# Patient Record
Sex: MALE | Race: OTHER | HISPANIC OR LATINO | URBAN - METROPOLITAN AREA
[De-identification: names, ages, dates, MRNs, and addresses within clinical notes are randomized per-mention and may not be internally consistent; named-entity substitution may affect disease eponyms.]

---

## 2023-01-19 NOTE — ASU PATIENT PROFILE, ADULT - FALL HARM RISK - UNIVERSAL INTERVENTIONS
Bed in lowest position, wheels locked, appropriate side rails in place/Call bell, personal items and telephone in reach/Instruct patient to call for assistance before getting out of bed or chair/Non-slip footwear when patient is out of bed/[place] to call system/Physically safe environment - no spills, clutter or unnecessary equipment/Purposeful Proactive Rounding/Room/bathroom lighting operational, light cord in reach

## 2023-01-19 NOTE — ASU PATIENT PROFILE, ADULT - NS PREOP UNDERSTANDS INFO
No solid food after midnight Sunday 1/22/23/yes No solid food after midnight Sunday 1/22/23, water before 12:00pm Monday./yes

## 2023-01-19 NOTE — ASU PATIENT PROFILE, ADULT - DOES PATIENT HAVE ADVANCE DIRECTIVE
wants to fill out form for health care proxy oon DOS/No wants to fill out form for health care proxy oon DOS/Yes

## 2023-01-23 ENCOUNTER — INPATIENT (INPATIENT)
Facility: HOSPITAL | Age: 68
LOS: 0 days | Discharge: HOME | DRG: 419 | End: 2023-01-24
Attending: STUDENT IN AN ORGANIZED HEALTH CARE EDUCATION/TRAINING PROGRAM | Admitting: STUDENT IN AN ORGANIZED HEALTH CARE EDUCATION/TRAINING PROGRAM
Payer: MEDICARE

## 2023-01-23 ENCOUNTER — OUTPATIENT (OUTPATIENT)
Dept: OUTPATIENT SERVICES | Facility: HOSPITAL | Age: 68
LOS: 1 days | Discharge: HOME | End: 2023-01-23
Payer: MEDICARE

## 2023-01-23 VITALS
OXYGEN SATURATION: 97 % | DIASTOLIC BLOOD PRESSURE: 92 MMHG | HEART RATE: 96 BPM | RESPIRATION RATE: 15 BRPM | SYSTOLIC BLOOD PRESSURE: 151 MMHG

## 2023-01-23 VITALS
RESPIRATION RATE: 16 BRPM | OXYGEN SATURATION: 97 % | HEIGHT: 66 IN | HEART RATE: 53 BPM | SYSTOLIC BLOOD PRESSURE: 125 MMHG | WEIGHT: 180.56 LBS | DIASTOLIC BLOOD PRESSURE: 68 MMHG | TEMPERATURE: 97 F

## 2023-01-23 DIAGNOSIS — K81.2 ACUTE CHOLECYSTITIS WITH CHRONIC CHOLECYSTITIS: ICD-10-CM

## 2023-01-23 DIAGNOSIS — E78.5 HYPERLIPIDEMIA, UNSPECIFIED: ICD-10-CM

## 2023-01-23 DIAGNOSIS — Z98.890 OTHER SPECIFIED POSTPROCEDURAL STATES: Chronic | ICD-10-CM

## 2023-01-23 DIAGNOSIS — K81.0 ACUTE CHOLECYSTITIS: ICD-10-CM

## 2023-01-23 DIAGNOSIS — I10 ESSENTIAL (PRIMARY) HYPERTENSION: ICD-10-CM

## 2023-01-23 DIAGNOSIS — K66.0 PERITONEAL ADHESIONS (POSTPROCEDURAL) (POSTINFECTION): ICD-10-CM

## 2023-01-23 PROCEDURE — 88304 TISSUE EXAM BY PATHOLOGIST: CPT | Mod: 26

## 2023-01-23 DEVICE — SURGIFLO HEMOSTATIC MATRIX KIT: Type: IMPLANTABLE DEVICE | Status: FUNCTIONAL

## 2023-01-23 RX ORDER — HYDRALAZINE HCL 50 MG
10 TABLET ORAL
Refills: 0 | Status: COMPLETED | OUTPATIENT
Start: 2023-01-23 | End: 2023-01-23

## 2023-01-23 RX ORDER — CHLORHEXIDINE GLUCONATE 213 G/1000ML
1 SOLUTION TOPICAL ONCE
Refills: 0 | Status: DISCONTINUED | OUTPATIENT
Start: 2023-01-23 | End: 2023-01-23

## 2023-01-23 RX ORDER — ACETAMINOPHEN 500 MG
650 TABLET ORAL EVERY 6 HOURS
Refills: 0 | Status: DISCONTINUED | OUTPATIENT
Start: 2023-01-23 | End: 2023-01-24

## 2023-01-23 RX ORDER — APREPITANT 80 MG/1
40 CAPSULE ORAL ONCE
Refills: 0 | Status: DISCONTINUED | OUTPATIENT
Start: 2023-01-23 | End: 2023-01-23

## 2023-01-23 RX ORDER — ONDANSETRON 8 MG/1
4 TABLET, FILM COATED ORAL ONCE
Refills: 0 | Status: DISCONTINUED | OUTPATIENT
Start: 2023-01-23 | End: 2023-01-23

## 2023-01-23 RX ORDER — OXYCODONE HYDROCHLORIDE 5 MG/1
5 TABLET ORAL EVERY 6 HOURS
Refills: 0 | Status: DISCONTINUED | OUTPATIENT
Start: 2023-01-23 | End: 2023-01-24

## 2023-01-23 RX ORDER — METOPROLOL TARTRATE 50 MG
5 TABLET ORAL ONCE
Refills: 0 | Status: COMPLETED | OUTPATIENT
Start: 2023-01-23 | End: 2023-01-23

## 2023-01-23 RX ORDER — FENTANYL CITRATE 50 UG/ML
25 INJECTION INTRAVENOUS
Refills: 0 | Status: DISCONTINUED | OUTPATIENT
Start: 2023-01-23 | End: 2023-01-23

## 2023-01-23 RX ORDER — ACETAMINOPHEN 500 MG
1000 TABLET ORAL ONCE
Refills: 0 | Status: DISCONTINUED | OUTPATIENT
Start: 2023-01-23 | End: 2023-01-23

## 2023-01-23 RX ORDER — ROSUVASTATIN CALCIUM 5 MG/1
1 TABLET ORAL
Qty: 0 | Refills: 0 | DISCHARGE

## 2023-01-23 RX ORDER — SODIUM CHLORIDE 9 MG/ML
1000 INJECTION, SOLUTION INTRAVENOUS
Refills: 0 | Status: DISCONTINUED | OUTPATIENT
Start: 2023-01-23 | End: 2023-01-23

## 2023-01-23 RX ADMIN — Medication 5 MILLIGRAM(S): at 21:15

## 2023-01-23 RX ADMIN — Medication 650 MILLIGRAM(S): at 23:56

## 2023-01-23 RX ADMIN — SODIUM CHLORIDE 100 MILLILITER(S): 9 INJECTION, SOLUTION INTRAVENOUS at 20:20

## 2023-01-23 RX ADMIN — Medication 10 MILLIGRAM(S): at 20:45

## 2023-01-23 NOTE — BRIEF OPERATIVE NOTE - OPERATION/FINDINGS
Cristian cutdown. Rest of ports placed under direct visualization. Chronically inflamed, fibrotic gallbladder with a large stone in the infundibulum and significant omental adhesions to the gallbladder. Gallbladder dissected free of attachments with combination of sharp, blunt and electrocautery-assisted dissection. Critical view of safety achieved. Short cystic duct noted, and CBD identified. Cystic duct and artery clipped and amputated. Gallbladder dissected free of fossa and removed in endocatch bag. RUQ irrigated. Hemostatic at end of case. Fascia closed with 0 Vicryl figure of 8. Skin closed with 4-0 Monocryl interrupted.

## 2023-01-23 NOTE — PATIENT PROFILE ADULT - FALL HARM RISK - HARM RISK INTERVENTIONS

## 2023-01-23 NOTE — BRIEF OPERATIVE NOTE - COMMENTS
Given difficulty of dissection and late hour of the case, decision was made to transfer the patient to [facility] postoperatively for overnight monitoring and morning labs.

## 2023-01-23 NOTE — CHART NOTE - NSCHARTNOTEFT_GEN_A_CORE
Pt is a 66yo M pt with pmh of htn, hld, and chronic cholecystitis who presented to [facility] on 1/23/23 for an elective laparoscopic cholecystectomy. Upon entering the abdomen, gallbladder noted to be fibrosed and chronically inflamed which required extensive dissection and a prolonged intraoperative course. The surgery was completed without complication and the patient was transferred to the PACU in stable condition, however the decision was made to transfer the patient to [facility] for overnight observation and hemodynamic monitoring. The risks and benefits of the transferred were discussed with the patient and his wife who were both in agreement with the transfer. Pt will be admitted to regional floor under Dr. Lowe with plan for clear liquid diet, pain/nausea control, labs, and hemodynamic monitoring. Transfer accepted by the [facility] transfer service and paperwork completed and placed in patient's chart.

## 2023-01-24 VITALS
RESPIRATION RATE: 18 BRPM | SYSTOLIC BLOOD PRESSURE: 115 MMHG | DIASTOLIC BLOOD PRESSURE: 64 MMHG | OXYGEN SATURATION: 96 % | TEMPERATURE: 98 F | HEART RATE: 97 BPM

## 2023-01-24 VITALS
SYSTOLIC BLOOD PRESSURE: 149 MMHG | DIASTOLIC BLOOD PRESSURE: 81 MMHG | OXYGEN SATURATION: 96 % | TEMPERATURE: 98 F | HEART RATE: 90 BPM | RESPIRATION RATE: 18 BRPM

## 2023-01-24 LAB
ALBUMIN SERPL ELPH-MCNC: 4 G/DL — SIGNIFICANT CHANGE UP (ref 3.3–5)
ALBUMIN SERPL ELPH-MCNC: 4.1 G/DL — SIGNIFICANT CHANGE UP (ref 3.3–5)
ALP SERPL-CCNC: 44 U/L — SIGNIFICANT CHANGE UP (ref 40–120)
ALP SERPL-CCNC: 46 U/L — SIGNIFICANT CHANGE UP (ref 40–120)
ALT FLD-CCNC: 54 U/L — HIGH (ref 10–45)
ALT FLD-CCNC: 56 U/L — HIGH (ref 10–45)
ANION GAP SERPL CALC-SCNC: 10 MMOL/L — SIGNIFICANT CHANGE UP (ref 5–17)
ANION GAP SERPL CALC-SCNC: 13 MMOL/L — SIGNIFICANT CHANGE UP (ref 5–17)
AST SERPL-CCNC: 49 U/L — HIGH (ref 10–40)
AST SERPL-CCNC: 55 U/L — HIGH (ref 10–40)
BILIRUB SERPL-MCNC: 0.2 MG/DL — SIGNIFICANT CHANGE UP (ref 0.2–1.2)
BILIRUB SERPL-MCNC: 0.3 MG/DL — SIGNIFICANT CHANGE UP (ref 0.2–1.2)
BUN SERPL-MCNC: 16 MG/DL — SIGNIFICANT CHANGE UP (ref 7–23)
BUN SERPL-MCNC: 17 MG/DL — SIGNIFICANT CHANGE UP (ref 7–23)
CALCIUM SERPL-MCNC: 8.9 MG/DL — SIGNIFICANT CHANGE UP (ref 8.4–10.5)
CALCIUM SERPL-MCNC: 9 MG/DL — SIGNIFICANT CHANGE UP (ref 8.4–10.5)
CHLORIDE SERPL-SCNC: 102 MMOL/L — SIGNIFICANT CHANGE UP (ref 96–108)
CHLORIDE SERPL-SCNC: 103 MMOL/L — SIGNIFICANT CHANGE UP (ref 96–108)
CO2 SERPL-SCNC: 21 MMOL/L — LOW (ref 22–31)
CO2 SERPL-SCNC: 23 MMOL/L — SIGNIFICANT CHANGE UP (ref 22–31)
CREAT SERPL-MCNC: 0.92 MG/DL — SIGNIFICANT CHANGE UP (ref 0.5–1.3)
CREAT SERPL-MCNC: 0.93 MG/DL — SIGNIFICANT CHANGE UP (ref 0.5–1.3)
EGFR: 89 ML/MIN/1.73M2 — SIGNIFICANT CHANGE UP
EGFR: 91 ML/MIN/1.73M2 — SIGNIFICANT CHANGE UP
GLUCOSE SERPL-MCNC: 184 MG/DL — HIGH (ref 70–99)
GLUCOSE SERPL-MCNC: 194 MG/DL — HIGH (ref 70–99)
HCT VFR BLD CALC: 38.9 % — LOW (ref 39–50)
HCT VFR BLD CALC: 39.2 % — SIGNIFICANT CHANGE UP (ref 39–50)
HGB BLD-MCNC: 13 G/DL — SIGNIFICANT CHANGE UP (ref 13–17)
HGB BLD-MCNC: 13 G/DL — SIGNIFICANT CHANGE UP (ref 13–17)
MAGNESIUM SERPL-MCNC: 1.7 MG/DL — SIGNIFICANT CHANGE UP (ref 1.6–2.6)
MAGNESIUM SERPL-MCNC: 2.1 MG/DL — SIGNIFICANT CHANGE UP (ref 1.6–2.6)
MCHC RBC-ENTMCNC: 29.6 PG — SIGNIFICANT CHANGE UP (ref 27–34)
MCHC RBC-ENTMCNC: 29.7 PG — SIGNIFICANT CHANGE UP (ref 27–34)
MCHC RBC-ENTMCNC: 33.2 GM/DL — SIGNIFICANT CHANGE UP (ref 32–36)
MCHC RBC-ENTMCNC: 33.4 GM/DL — SIGNIFICANT CHANGE UP (ref 32–36)
MCV RBC AUTO: 89 FL — SIGNIFICANT CHANGE UP (ref 80–100)
MCV RBC AUTO: 89.3 FL — SIGNIFICANT CHANGE UP (ref 80–100)
NRBC # BLD: 0 /100 WBCS — SIGNIFICANT CHANGE UP (ref 0–0)
NRBC # BLD: 0 /100 WBCS — SIGNIFICANT CHANGE UP (ref 0–0)
PHOSPHATE SERPL-MCNC: 2.7 MG/DL — SIGNIFICANT CHANGE UP (ref 2.5–4.5)
PHOSPHATE SERPL-MCNC: 3.3 MG/DL — SIGNIFICANT CHANGE UP (ref 2.5–4.5)
PLATELET # BLD AUTO: 218 K/UL — SIGNIFICANT CHANGE UP (ref 150–400)
PLATELET # BLD AUTO: 239 K/UL — SIGNIFICANT CHANGE UP (ref 150–400)
POTASSIUM SERPL-MCNC: 3.6 MMOL/L — SIGNIFICANT CHANGE UP (ref 3.5–5.3)
POTASSIUM SERPL-MCNC: 4 MMOL/L — SIGNIFICANT CHANGE UP (ref 3.5–5.3)
POTASSIUM SERPL-SCNC: 3.6 MMOL/L — SIGNIFICANT CHANGE UP (ref 3.5–5.3)
POTASSIUM SERPL-SCNC: 4 MMOL/L — SIGNIFICANT CHANGE UP (ref 3.5–5.3)
PROT SERPL-MCNC: 6.8 G/DL — SIGNIFICANT CHANGE UP (ref 6–8.3)
PROT SERPL-MCNC: 6.9 G/DL — SIGNIFICANT CHANGE UP (ref 6–8.3)
RBC # BLD: 4.37 M/UL — SIGNIFICANT CHANGE UP (ref 4.2–5.8)
RBC # BLD: 4.39 M/UL — SIGNIFICANT CHANGE UP (ref 4.2–5.8)
RBC # FLD: 13.6 % — SIGNIFICANT CHANGE UP (ref 10.3–14.5)
RBC # FLD: 13.6 % — SIGNIFICANT CHANGE UP (ref 10.3–14.5)
SODIUM SERPL-SCNC: 136 MMOL/L — SIGNIFICANT CHANGE UP (ref 135–145)
SODIUM SERPL-SCNC: 136 MMOL/L — SIGNIFICANT CHANGE UP (ref 135–145)
WBC # BLD: 8.82 K/UL — SIGNIFICANT CHANGE UP (ref 3.8–10.5)
WBC # BLD: 8.83 K/UL — SIGNIFICANT CHANGE UP (ref 3.8–10.5)
WBC # FLD AUTO: 8.82 K/UL — SIGNIFICANT CHANGE UP (ref 3.8–10.5)
WBC # FLD AUTO: 8.83 K/UL — SIGNIFICANT CHANGE UP (ref 3.8–10.5)

## 2023-01-24 PROCEDURE — 47562 LAPAROSCOPIC CHOLECYSTECTOMY: CPT

## 2023-01-24 PROCEDURE — 85027 COMPLETE CBC AUTOMATED: CPT

## 2023-01-24 PROCEDURE — 80053 COMPREHEN METABOLIC PANEL: CPT

## 2023-01-24 PROCEDURE — 36415 COLL VENOUS BLD VENIPUNCTURE: CPT

## 2023-01-24 PROCEDURE — 84100 ASSAY OF PHOSPHORUS: CPT

## 2023-01-24 PROCEDURE — 49329 UNLSTD LAPS PX ABD PERTM&OMN: CPT

## 2023-01-24 PROCEDURE — 83735 ASSAY OF MAGNESIUM: CPT

## 2023-01-24 RX ORDER — NEBIVOLOL HYDROCHLORIDE 5 MG/1
10 TABLET ORAL DAILY
Refills: 0 | Status: DISCONTINUED | OUTPATIENT
Start: 2023-01-24 | End: 2023-01-24

## 2023-01-24 RX ORDER — FENOFIBRATE,MICRONIZED 130 MG
1 CAPSULE ORAL
Qty: 0 | Refills: 0 | DISCHARGE

## 2023-01-24 RX ORDER — NEBIVOLOL HYDROCHLORIDE 5 MG/1
1 TABLET ORAL
Qty: 0 | Refills: 0 | DISCHARGE

## 2023-01-24 RX ORDER — OXYCODONE HYDROCHLORIDE 5 MG/1
1 TABLET ORAL
Qty: 8 | Refills: 0
Start: 2023-01-24

## 2023-01-24 RX ORDER — MAGNESIUM SULFATE 500 MG/ML
1 VIAL (ML) INJECTION ONCE
Refills: 0 | Status: COMPLETED | OUTPATIENT
Start: 2023-01-24 | End: 2023-01-24

## 2023-01-24 RX ORDER — DOCUSATE SODIUM 100 MG
1 CAPSULE ORAL
Qty: 15 | Refills: 0
Start: 2023-01-24

## 2023-01-24 RX ORDER — ASPIRIN/CALCIUM CARB/MAGNESIUM 324 MG
1 TABLET ORAL
Qty: 0 | Refills: 0 | DISCHARGE

## 2023-01-24 RX ORDER — ONDANSETRON 8 MG/1
4 TABLET, FILM COATED ORAL ONCE
Refills: 0 | Status: DISCONTINUED | OUTPATIENT
Start: 2023-01-24 | End: 2023-01-24

## 2023-01-24 RX ADMIN — Medication 650 MILLIGRAM(S): at 07:16

## 2023-01-24 RX ADMIN — Medication 100 GRAM(S): at 01:18

## 2023-01-24 RX ADMIN — NEBIVOLOL HYDROCHLORIDE 10 MILLIGRAM(S): 5 TABLET ORAL at 07:06

## 2023-01-24 RX ADMIN — Medication 650 MILLIGRAM(S): at 00:56

## 2023-01-24 RX ADMIN — Medication 650 MILLIGRAM(S): at 06:16

## 2023-01-24 NOTE — CHART NOTE - NSCHARTNOTEFT_GEN_A_CORE
66 y/o M pmh HTN, HLD, Chronic Cholecystitis presented on 1/23 for elective cholecystectomy. Challenging dissection w/ chronic inflammation, fibrosis, and prolonged intraoperative course. Decision made to transfer to [facility] for closed hemodynamic monitoring. EBL 50, 1100 Crystalloid. On arrival states pain much improved. Afebrile, HR 90, /81, 96% on RA, Abd soft, appropriate post surgical tenderness, no rebound or guarding. WBC 8.83, Hgb 13.0 (13.7), Cr 0.93 (1.2) AST/ALT 55/56, Alk Phos 46. Plan: HD monitoring, Fluid resuscitation, AINSLEY,

## 2023-01-24 NOTE — ASU DISCHARGE PLAN (ADULT/PEDIATRIC) - CARE PROVIDER_API CALL
Faustino Lowe (DO)  Surgery  [address] [place] [address], [address]  [place] [address]  Phone: [phone]  Fax: [phone]  Follow Up Time: 1 week

## 2023-01-24 NOTE — PROGRESS NOTE ADULT - SUBJECTIVE AND OBJECTIVE BOX
STATUS POST:  laparoscopic cholecystectomy     POST OPERATIVE DAY #: 1    SUBJECTIVE: Pt seen and examined at bedside this am by surgery team. Feels improved. Ambulated last night. Tolerating CLD without nausea or vomiting, passing gas but no having bowel movements. Pain well controlled. Denies f/n/v/cp/sob.    Vital Signs Last 24 Hrs  T(C): 36.4 (24 Jan 2023 05:44), Max: 36.8 (24 Jan 2023 00:16)  T(F): 97.5 (24 Jan 2023 05:44), Max: 98.2 (24 Jan 2023 00:16)  HR: 97 (24 Jan 2023 05:44) (53 - 97)  BP: 115/64 (24 Jan 2023 05:44) (115/64 - 190/95)  BP(mean): --  RR: 18 (24 Jan 2023 05:44) (12 - 18)  SpO2: 96% (24 Jan 2023 05:44) (95% - 100%)    Parameters below as of 24 Jan 2023 05:44  Patient On (Oxygen Delivery Method): room air        PHYSICAL EXAM:   Gen: Awake, alert, NAD, resting comfortably   CV: RRR  Pulm: no respiratory distress on RA  Abd: soft, ND, NTTP, no rebound or guarding, incisions c/d/i   Ext: WWP, SCDs in place     I&O's Detail    23 Jan 2023 07:01  -  24 Jan 2023 07:00  --------------------------------------------------------  IN:    IV PiggyBack: 100 mL    Oral Fluid: 100 mL  Total IN: 200 mL    OUT:    Voided (mL): 300 mL  Total OUT: 300 mL    Total NET: -100 mL          LABS:                        13.0   8.82  )-----------( 239      ( 24 Jan 2023 06:17 )             39.2     01-24    136  |  103  |  16  ----------------------------<  184<H>  4.0   |  23  |  0.92    Ca    9.0      24 Jan 2023 06:17  Phos  3.3     01-24  Mg     2.1     01-24    TPro  6.8  /  Alb  4.0  /  TBili  0.3  /  DBili  x   /  AST  49<H>  /  ALT  54<H>  /  AlkPhos  44  01-24    LIVER FUNCTIONS - ( 24 Jan 2023 06:17 )  Alb: 4.0 g/dL / Pro: 6.8 g/dL / ALK PHOS: 44 U/L / ALT: 54 U/L / AST: 49 U/L / GGT: x             CAPILLARY BLOOD GLUCOSE          RADIOLOGY & ADDITIONAL STUDIES:   STATUS POST:  laparoscopic cholecystectomy     POST OPERATIVE DAY #: 1    SUBJECTIVE: Pt seen and examined at bedside this am by surgery team. Feels improved. Ambulated last night. Tolerating CLD without nausea or vomiting, passing gas but no having bowel movements. Pain well controlled. Denies f/n/v/cp/sob.  Per patient, his outpatient cardiologist has told him to stop taking aspirin, including post-op.    Vital Signs Last 24 Hrs  T(C): 36.4 (24 Jan 2023 05:44), Max: 36.8 (24 Jan 2023 00:16)  T(F): 97.5 (24 Jan 2023 05:44), Max: 98.2 (24 Jan 2023 00:16)  HR: 97 (24 Jan 2023 05:44) (53 - 97)  BP: 115/64 (24 Jan 2023 05:44) (115/64 - 190/95)  BP(mean): --  RR: 18 (24 Jan 2023 05:44) (12 - 18)  SpO2: 96% (24 Jan 2023 05:44) (95% - 100%)    Parameters below as of 24 Jan 2023 05:44  Patient On (Oxygen Delivery Method): room air        PHYSICAL EXAM:   Gen: Awake, alert, NAD, resting comfortably   CV: RRR  Pulm: no respiratory distress on RA  Abd: soft, ND, NTTP, no rebound or guarding, incisions c/d/i   Ext: WWP, SCDs in place     I&O's Detail    23 Jan 2023 07:01  -  24 Jan 2023 07:00  --------------------------------------------------------  IN:    IV PiggyBack: 100 mL    Oral Fluid: 100 mL  Total IN: 200 mL    OUT:    Voided (mL): 300 mL  Total OUT: 300 mL    Total NET: -100 mL          LABS:                        13.0   8.82  )-----------( 239      ( 24 Jan 2023 06:17 )             39.2     01-24    136  |  103  |  16  ----------------------------<  184<H>  4.0   |  23  |  0.92    Ca    9.0      24 Jan 2023 06:17  Phos  3.3     01-24  Mg     2.1     01-24    TPro  6.8  /  Alb  4.0  /  TBili  0.3  /  DBili  x   /  AST  49<H>  /  ALT  54<H>  /  AlkPhos  44  01-24    LIVER FUNCTIONS - ( 24 Jan 2023 06:17 )  Alb: 4.0 g/dL / Pro: 6.8 g/dL / ALK PHOS: 44 U/L / ALT: 54 U/L / AST: 49 U/L / GGT: x             CAPILLARY BLOOD GLUCOSE          RADIOLOGY & ADDITIONAL STUDIES:

## 2023-01-24 NOTE — ASU DISCHARGE PLAN (ADULT/PEDIATRIC) - CALL YOUR DOCTOR IF YOU HAVE ANY OF THE FOLLOWING:
Bleeding that does not stop/Swelling that gets worse/Pain not relieved by Medications/Fever greater than (need to indicate Fahrenheit or Celsius)/Wound/Surgical Site with redness, or foul smelling discharge or pus/Nausea and vomiting that does not stop/Excessive diarrhea/Inability to tolerate liquids or foods Bleeding that does not stop/Swelling that gets worse/Pain not relieved by Medications/Fever greater than (need to indicate Fahrenheit or Celsius)/Wound/Surgical Site with redness, or foul smelling discharge or pus/Nausea and vomiting that does not stop/Unable to urinate/Excessive diarrhea/Inability to tolerate liquids or foods

## 2023-01-24 NOTE — ASU DISCHARGE PLAN (ADULT/PEDIATRIC) - NS MD DC FALL RISK RISK
For information on Fall & Injury Prevention, visit: [email or web address] OR  [email or web address] OR  https://www.cdc.gov/steadi/patient.html

## 2023-01-24 NOTE — CHART NOTE - NSCHARTNOTEFT_GEN_A_CORE
Patient received from [facility] for observation s/p difficult lap brad.    SUBJECTIVE:   Patient seen and examined. No new complaints, pain controlled. -n-v-cp-sob.    Vital Signs Last 24 Hrs  T(C): 36.8 (24 Jan 2023 00:16), Max: 36.8 (24 Jan 2023 00:16)  T(F): 98.2 (24 Jan 2023 00:16), Max: 98.2 (24 Jan 2023 00:16)  HR: 90 (24 Jan 2023 00:16) (53 - 96)  BP: 149/81 (24 Jan 2023 00:16) (125/68 - 190/95)  BP(mean): --  RR: 18 (24 Jan 2023 00:16) (12 - 18)  SpO2: 96% (24 Jan 2023 00:16) (95% - 100%)    Parameters below as of 24 Jan 2023 00:16  Patient On (Oxygen Delivery Method): room air        I&O's Summary    23 Jan 2023 07:01  -  24 Jan 2023 02:04  --------------------------------------------------------  IN: 200 mL / OUT: 0 mL / NET: 200 mL        Physical Exam:  General Appearance: Appears well, NAD  Pulmonary: Nonlabored breathing, no respiratory distress  Cardiovascular: NSR  Abdomen: Soft, nondistended, nontender. incisions cdi.  Extremities: WWP, SCD's in place     LABS:                        13.0   8.83  )-----------( 218      ( 24 Jan 2023 00:20 )             38.9     01-24    136  |  102  |  17  ----------------------------<  194<H>  3.6   |  21<L>  |  0.93    Ca    8.9      24 Jan 2023 00:20  Phos  2.7     01-24  Mg     1.7     01-24    TPro  6.9  /  Alb  4.1  /  TBili  0.2  /  DBili  x   /  AST  55<H>  /  ALT  56<H>  /  AlkPhos  46  01-24      67M PMHx HTN, chronic cholecystitis underwent laparoscopic cholecystectomy (1/23) with a chronically inflamed, fibrotic gallbladder, transferred to [facility] for overnight observation.    pain/nausea control  IS/OOB  holding home aspirin  home statin, enalapril, bysolic  CLD  SCDs Patient received from [facility] for observation s/p difficult lap brad.    SUBJECTIVE:   Patient seen and examined. No new complaints, pain controlled. -n-v-cp-sob.    Vital Signs Last 24 Hrs  T(C): 36.8 (24 Jan 2023 00:16), Max: 36.8 (24 Jan 2023 00:16)  T(F): 98.2 (24 Jan 2023 00:16), Max: 98.2 (24 Jan 2023 00:16)  HR: 90 (24 Jan 2023 00:16) (53 - 96)  BP: 149/81 (24 Jan 2023 00:16) (125/68 - 190/95)  BP(mean): --  RR: 18 (24 Jan 2023 00:16) (12 - 18)  SpO2: 96% (24 Jan 2023 00:16) (95% - 100%)    Parameters below as of 24 Jan 2023 00:16  Patient On (Oxygen Delivery Method): room air        I&O's Summary    23 Jan 2023 07:01  -  24 Jan 2023 02:04  --------------------------------------------------------  IN: 200 mL / OUT: 0 mL / NET: 200 mL        Physical Exam:  General Appearance: Appears well, NAD  Pulmonary: Nonlabored breathing, no respiratory distress  Cardiovascular: NSR  Abdomen: Soft, nondistended, nontender. incisions cdi.  Extremities: WWP, SCD's in place     LABS:                        13.0   8.83  )-----------( 218      ( 24 Jan 2023 00:20 )             38.9     01-24    136  |  102  |  17  ----------------------------<  194<H>  3.6   |  21<L>  |  0.93    Ca    8.9      24 Jan 2023 00:20  Phos  2.7     01-24  Mg     1.7     01-24    TPro  6.9  /  Alb  4.1  /  TBili  0.2  /  DBili  x   /  AST  55<H>  /  ALT  56<H>  /  AlkPhos  46  01-24      67M PMHx HTN, chronic cholecystitis underwent laparoscopic cholecystectomy (1/23) with a chronically inflamed, fibrotic gallbladder, transferred to [facility] for overnight observation.    pain/nausea control  IS/OOB  holding home aspirin  home enalapril, bysolic  CLD  SCDs

## 2023-01-24 NOTE — PROGRESS NOTE ADULT - ASSESSMENT
67M PMHx HTN, chronic cholecystitis underwent laparoscopic cholecystectomy (1/23) with a chronically inflamed, fibrotic gallbladder, transferred to [facility] for overnight observation.    Low fat diet   pain/nausea control  home enalapril, bysolic  holding home aspirin  IS/OOB/SCD 67M PMHx HTN, chronic cholecystitis underwent laparoscopic cholecystectomy (1/23) with a chronically inflamed, fibrotic gallbladder, transferred to [facility] for overnight observation.    Low fat diet   pain/nausea control  home enalapril, bysolic  IS/OOB/SCD

## 2023-01-24 NOTE — ASU DISCHARGE PLAN (ADULT/PEDIATRIC) - ASU DC SPECIAL INSTRUCTIONSFT
General Discharge Instructions:  Please resume all regular home medications unless specifically advised not to take a particular medication. Also, please take any new medications as prescribed.  Please get plenty of rest, continue to ambulate several times per day, and drink adequate amounts of fluids. Avoid lifting weights greater than 5-10 lbs until you follow-up with your surgeon, who will instruct you further regarding activity restrictions.  Avoid driving or operating heavy machinery while taking pain medications.  Please follow-up with your surgeon and Primary Care Provider (PCP) as advised.  Incision Care:  *Please call your doctor or nurse practitioner if you have increased pain, swelling, redness, or drainage from the incision site.  *Avoid swimming and baths until your follow-up appointment.  *You may shower, and wash surgical incisions with a mild soap and warm water. Gently pat the area dry.  *If you have staples, they will be removed at your follow-up appointment.  *If you have steri-strips, they will fall off on their own. Please remove any remaining strips 7-10 days after surgery.  Warning Signs:  Please call your doctor or nurse practitioner if you experience the following:  *You experience new chest pain, pressure, squeezing or tightness.  *New or worsening cough, shortness of breath, or wheeze.  *If you are vomiting and cannot keep down fluids or your medications.  *You are getting dehydrated due to continued vomiting, diarrhea, or other reasons. Signs of dehydration include dry mouth, rapid heartbeat, or feeling dizzy or faint when standing.  *You see blood or dark/black material when you vomit or have a bowel movement.  *You experience burning when you urinate, have blood in your urine, or experience a discharge.  *Your pain is not improving within 8-12 hours or is not gone within 24 hours. Call or return immediately if your pain is getting worse, changes location, or moves to your chest or back.  *You have shaking chills, or fever greater than 101.5 degrees Fahrenheit or 38 degrees Celsius.  *Any change in your symptoms, or any new symptoms that concern you. Please follow up with Dr. Lowe next week. Call his office to schedule an appointment.  Please follow up with your primary physician in 1-2 weeks for re evaluation.      Pain control:  Please take 2 tabs of extra strength Tylenol every 6 hours as needed for pain. DO NOT exceed 4000 mg per day. You have been prescribed oxycodone for pain not controlled by Tylenol. Take 1 tab every 6 hours as needed for severe pain. Please do not exceed 4 tabs per day. Take prescribed stool softener (colace) to prevent constipation caused by oxycodone.    General Discharge Instructions:  Please resume all regular home medications unless specifically advised not to take a particular medication. Also, please take any new medications as prescribed.  Please get plenty of rest, continue to ambulate several times per day, and drink adequate amounts of fluids. Avoid lifting weights greater than 5-10 lbs until you follow-up with your surgeon, who will instruct you further regarding activity restrictions.  Avoid driving or operating heavy machinery while taking pain medications.  Please follow-up with your surgeon and Primary Care Provider (PCP) as advised.  Incision Care:  *Please call your doctor or nurse practitioner if you have increased pain, swelling, redness, or drainage from the incision site.  *Avoid swimming and baths until your follow-up appointment.  *You may shower, and wash surgical incisions with a mild soap and warm water. Gently pat the area dry.  *If you have staples, they will be removed at your follow-up appointment.  *If you have steri-strips, they will fall off on their own. Please remove any remaining strips 7-10 days after surgery.  Warning Signs:  Please call your doctor or nurse practitioner if you experience the following:  *You experience new chest pain, pressure, squeezing or tightness.  *New or worsening cough, shortness of breath, or wheeze.  *If you are vomiting and cannot keep down fluids or your medications.  *You are getting dehydrated due to continued vomiting, diarrhea, or other reasons. Signs of dehydration include dry mouth, rapid heartbeat, or feeling dizzy or faint when standing.  *You see blood or dark/black material when you vomit or have a bowel movement.  *You experience burning when you urinate, have blood in your urine, or experience a discharge.  *Your pain is not improving within 8-12 hours or is not gone within 24 hours. Call or return immediately if your pain is getting worse, changes location, or moves to your chest or back.  *You have shaking chills, or fever greater than 101.5 degrees Fahrenheit or 38 degrees Celsius.  *Any change in your symptoms, or any new symptoms that concern you.

## 2023-01-27 LAB — SURGICAL PATHOLOGY STUDY: SIGNIFICANT CHANGE UP

## 2023-02-08 PROBLEM — I10 ESSENTIAL (PRIMARY) HYPERTENSION: Chronic | Status: ACTIVE | Noted: 2023-01-19

## 2023-02-08 PROBLEM — E78.5 HYPERLIPIDEMIA, UNSPECIFIED: Chronic | Status: ACTIVE | Noted: 2023-01-19
